# Patient Record
Sex: MALE | Race: WHITE | NOT HISPANIC OR LATINO | Employment: OTHER | URBAN - METROPOLITAN AREA
[De-identification: names, ages, dates, MRNs, and addresses within clinical notes are randomized per-mention and may not be internally consistent; named-entity substitution may affect disease eponyms.]

---

## 2023-02-20 ENCOUNTER — HOSPITAL ENCOUNTER (EMERGENCY)
Facility: CLINIC | Age: 73
Discharge: HOME | End: 2023-02-20
Attending: EMERGENCY MEDICINE | Admitting: EMERGENCY MEDICINE
Payer: COMMERCIAL

## 2023-02-20 VITALS
TEMPERATURE: 99.2 F | DIASTOLIC BLOOD PRESSURE: 76 MMHG | HEART RATE: 88 BPM | BODY MASS INDEX: 26.66 KG/M2 | WEIGHT: 180 LBS | HEIGHT: 69 IN | OXYGEN SATURATION: 97 % | SYSTOLIC BLOOD PRESSURE: 137 MMHG

## 2023-02-20 DIAGNOSIS — T83.511A URINARY TRACT INFECTION ASSOCIATED WITH CATHETERIZATION OF URINARY TRACT, UNSPECIFIED INDWELLING URINARY CATHETER TYPE, INITIAL ENCOUNTER (H): ICD-10-CM

## 2023-02-20 DIAGNOSIS — N39.0 URINARY TRACT INFECTION ASSOCIATED WITH CATHETERIZATION OF URINARY TRACT, UNSPECIFIED INDWELLING URINARY CATHETER TYPE, INITIAL ENCOUNTER (H): ICD-10-CM

## 2023-02-20 LAB
ALBUMIN UR-MCNC: 30 MG/DL
ANION GAP SERPL CALCULATED.3IONS-SCNC: 14 MMOL/L (ref 7–15)
APPEARANCE UR: ABNORMAL
BACTERIA #/AREA URNS HPF: ABNORMAL /HPF
BASOPHILS # BLD AUTO: 0 10E3/UL (ref 0–0.2)
BASOPHILS NFR BLD AUTO: 0 %
BILIRUB UR QL STRIP: NEGATIVE
BUN SERPL-MCNC: 13.6 MG/DL (ref 8–23)
CALCIUM SERPL-MCNC: 8.8 MG/DL (ref 8.8–10.2)
CHLORIDE SERPL-SCNC: 99 MMOL/L (ref 98–107)
COLOR UR AUTO: YELLOW
CREAT SERPL-MCNC: 1.08 MG/DL (ref 0.67–1.17)
DEPRECATED HCO3 PLAS-SCNC: 22 MMOL/L (ref 22–29)
EOSINOPHIL # BLD AUTO: 0 10E3/UL (ref 0–0.7)
EOSINOPHIL NFR BLD AUTO: 0 %
ERYTHROCYTE [DISTWIDTH] IN BLOOD BY AUTOMATED COUNT: 12.5 % (ref 10–15)
GFR SERPL CREATININE-BSD FRML MDRD: 72 ML/MIN/1.73M2
GLUCOSE SERPL-MCNC: 180 MG/DL (ref 70–99)
GLUCOSE UR STRIP-MCNC: NEGATIVE MG/DL
HCT VFR BLD AUTO: 36.5 % (ref 40–53)
HGB BLD-MCNC: 12.5 G/DL (ref 13.3–17.7)
HGB UR QL STRIP: ABNORMAL
IMM GRANULOCYTES # BLD: 0 10E3/UL
IMM GRANULOCYTES NFR BLD: 0 %
KETONES UR STRIP-MCNC: 15 MG/DL
LACTATE SERPL-SCNC: 1.1 MMOL/L (ref 0.7–2)
LEUKOCYTE ESTERASE UR QL STRIP: ABNORMAL
LYMPHOCYTES # BLD AUTO: 0.6 10E3/UL (ref 0.8–5.3)
LYMPHOCYTES NFR BLD AUTO: 6 %
MCH RBC QN AUTO: 31.4 PG (ref 26.5–33)
MCHC RBC AUTO-ENTMCNC: 34.2 G/DL (ref 31.5–36.5)
MCV RBC AUTO: 92 FL (ref 78–100)
MONOCYTES # BLD AUTO: 0.5 10E3/UL (ref 0–1.3)
MONOCYTES NFR BLD AUTO: 5 %
MUCOUS THREADS #/AREA URNS LPF: PRESENT /LPF
NEUTROPHILS # BLD AUTO: 8.9 10E3/UL (ref 1.6–8.3)
NEUTROPHILS NFR BLD AUTO: 89 %
NITRATE UR QL: NEGATIVE
NRBC # BLD AUTO: 0 10E3/UL
NRBC BLD AUTO-RTO: 0 /100
PH UR STRIP: 6.5 [PH] (ref 5–7)
PLATELET # BLD AUTO: 96 10E3/UL (ref 150–450)
POTASSIUM SERPL-SCNC: 3.9 MMOL/L (ref 3.4–5.3)
RBC # BLD AUTO: 3.98 10E6/UL (ref 4.4–5.9)
RBC URINE: 12 /HPF
SODIUM SERPL-SCNC: 135 MMOL/L (ref 136–145)
SP GR UR STRIP: 1.01 (ref 1–1.03)
SQUAMOUS EPITHELIAL: <1 /HPF
TRANSITIONAL EPI: 1 /HPF
UROBILINOGEN UR STRIP-MCNC: 4 MG/DL
WBC # BLD AUTO: 10.2 10E3/UL (ref 4–11)
WBC URINE: 129 /HPF

## 2023-02-20 PROCEDURE — 83605 ASSAY OF LACTIC ACID: CPT | Performed by: EMERGENCY MEDICINE

## 2023-02-20 PROCEDURE — 99284 EMERGENCY DEPT VISIT MOD MDM: CPT | Performed by: EMERGENCY MEDICINE

## 2023-02-20 PROCEDURE — 87149 DNA/RNA DIRECT PROBE: CPT | Performed by: EMERGENCY MEDICINE

## 2023-02-20 PROCEDURE — 99284 EMERGENCY DEPT VISIT MOD MDM: CPT | Mod: 25 | Performed by: EMERGENCY MEDICINE

## 2023-02-20 PROCEDURE — 250N000011 HC RX IP 250 OP 636: Performed by: EMERGENCY MEDICINE

## 2023-02-20 PROCEDURE — 85025 COMPLETE CBC W/AUTO DIFF WBC: CPT | Performed by: EMERGENCY MEDICINE

## 2023-02-20 PROCEDURE — 96365 THER/PROPH/DIAG IV INF INIT: CPT | Performed by: EMERGENCY MEDICINE

## 2023-02-20 PROCEDURE — 87186 SC STD MICRODIL/AGAR DIL: CPT | Performed by: EMERGENCY MEDICINE

## 2023-02-20 PROCEDURE — 87077 CULTURE AEROBIC IDENTIFY: CPT | Performed by: EMERGENCY MEDICINE

## 2023-02-20 PROCEDURE — 80048 BASIC METABOLIC PNL TOTAL CA: CPT | Performed by: EMERGENCY MEDICINE

## 2023-02-20 PROCEDURE — 81003 URINALYSIS AUTO W/O SCOPE: CPT | Performed by: EMERGENCY MEDICINE

## 2023-02-20 PROCEDURE — 51798 US URINE CAPACITY MEASURE: CPT | Performed by: EMERGENCY MEDICINE

## 2023-02-20 PROCEDURE — 36415 COLL VENOUS BLD VENIPUNCTURE: CPT | Performed by: EMERGENCY MEDICINE

## 2023-02-20 PROCEDURE — 81001 URINALYSIS AUTO W/SCOPE: CPT | Performed by: EMERGENCY MEDICINE

## 2023-02-20 RX ORDER — CEFTRIAXONE 2 G/1
2 INJECTION, POWDER, FOR SOLUTION INTRAMUSCULAR; INTRAVENOUS ONCE
Status: COMPLETED | OUTPATIENT
Start: 2023-02-20 | End: 2023-02-20

## 2023-02-20 RX ORDER — PHENAZOPYRIDINE HYDROCHLORIDE 200 MG/1
200 TABLET, FILM COATED ORAL 3 TIMES DAILY PRN
Qty: 12 TABLET | Refills: 0 | Status: SHIPPED | OUTPATIENT
Start: 2023-02-20

## 2023-02-20 RX ORDER — SULFAMETHOXAZOLE/TRIMETHOPRIM 800-160 MG
1 TABLET ORAL 2 TIMES DAILY
Qty: 14 TABLET | Refills: 0 | Status: SHIPPED | OUTPATIENT
Start: 2023-02-21 | End: 2023-02-28

## 2023-02-20 RX ADMIN — CEFTRIAXONE SODIUM 2 G: 2 INJECTION, POWDER, FOR SOLUTION INTRAMUSCULAR; INTRAVENOUS at 19:37

## 2023-02-20 ASSESSMENT — ACTIVITIES OF DAILY LIVING (ADL): ADLS_ACUITY_SCORE: 35

## 2023-02-20 NOTE — ED TRIAGE NOTES
Blood in urine.  Pain and frequency.  No flank pain.     Triage Assessment       Row Name 02/20/23 2376       Triage Assessment (Adult)    Airway WDL WDL       Respiratory WDL    Respiratory WDL WDL       Skin Circulation/Temperature WDL    Skin Circulation/Temperature WDL WDL       Cardiac WDL    Cardiac WDL WDL       Peripheral/Neurovascular WDL    Peripheral Neurovascular WDL WDL       Cognitive/Neuro/Behavioral WDL    Cognitive/Neuro/Behavioral WDL WDL

## 2023-02-20 NOTE — ED TRIAGE NOTES
Triage Assessment     Row Name 02/20/23 7486       Triage Assessment (Adult)    Airway WDL WDL       Respiratory WDL    Respiratory WDL WDL       Skin Circulation/Temperature WDL    Skin Circulation/Temperature WDL WDL       Cardiac WDL    Cardiac WDL WDL       Peripheral/Neurovascular WDL    Peripheral Neurovascular WDL WDL       Cognitive/Neuro/Behavioral WDL    Cognitive/Neuro/Behavioral WDL WDL

## 2023-02-21 ENCOUNTER — APPOINTMENT (OUTPATIENT)
Dept: CT IMAGING | Facility: CLINIC | Age: 73
End: 2023-02-21
Attending: EMERGENCY MEDICINE
Payer: COMMERCIAL

## 2023-02-21 ENCOUNTER — HOSPITAL ENCOUNTER (OUTPATIENT)
Facility: CLINIC | Age: 73
Setting detail: OBSERVATION
Discharge: HOME | End: 2023-02-23
Attending: EMERGENCY MEDICINE | Admitting: INTERNAL MEDICINE
Payer: COMMERCIAL

## 2023-02-21 DIAGNOSIS — R78.81 GRAM-NEGATIVE BACTEREMIA: ICD-10-CM

## 2023-02-21 DIAGNOSIS — N39.0 URINARY TRACT INFECTION WITHOUT HEMATURIA, SITE UNSPECIFIED: ICD-10-CM

## 2023-02-21 LAB
ACINETOBACTER SPECIES: NOT DETECTED
ALBUMIN UR-MCNC: 100 MG/DL
ANION GAP SERPL CALCULATED.3IONS-SCNC: 10 MMOL/L (ref 7–15)
APPEARANCE UR: CLEAR
BASOPHILS # BLD AUTO: 0 10E3/UL (ref 0–0.2)
BASOPHILS NFR BLD AUTO: 0 %
BILIRUB UR QL STRIP: NEGATIVE
BUN SERPL-MCNC: 17.7 MG/DL (ref 8–23)
CALCIUM SERPL-MCNC: 8.9 MG/DL (ref 8.8–10.2)
CHLORIDE SERPL-SCNC: 100 MMOL/L (ref 98–107)
CITROBACTER SPECIES: NOT DETECTED
COLOR UR AUTO: ABNORMAL
CREAT SERPL-MCNC: 1.2 MG/DL (ref 0.67–1.17)
CTX-M: NOT DETECTED
DEPRECATED HCO3 PLAS-SCNC: 26 MMOL/L (ref 22–29)
ENTEROBACTER SPECIES: NOT DETECTED
EOSINOPHIL # BLD AUTO: 0.1 10E3/UL (ref 0–0.7)
EOSINOPHIL NFR BLD AUTO: 1 %
ERYTHROCYTE [DISTWIDTH] IN BLOOD BY AUTOMATED COUNT: 12.3 % (ref 10–15)
ESCHERICHIA COLI: DETECTED
GFR SERPL CREATININE-BSD FRML MDRD: 63 ML/MIN/1.73M2
GLUCOSE SERPL-MCNC: 156 MG/DL (ref 70–99)
GLUCOSE UR STRIP-MCNC: NEGATIVE MG/DL
HCT VFR BLD AUTO: 37.1 % (ref 40–53)
HGB BLD-MCNC: 12.8 G/DL (ref 13.3–17.7)
HGB UR QL STRIP: NEGATIVE
HOLD SPECIMEN: NORMAL
IMM GRANULOCYTES # BLD: 0 10E3/UL
IMM GRANULOCYTES NFR BLD: 1 %
IMP: NOT DETECTED
KETONES UR STRIP-MCNC: NEGATIVE MG/DL
KLEBSIELLA OXYTOCA: NOT DETECTED
KLEBSIELLA PNEUMONIAE: NOT DETECTED
KPC: NOT DETECTED
LACTATE SERPL-SCNC: 0.7 MMOL/L (ref 0.7–2)
LEUKOCYTE ESTERASE UR QL STRIP: NEGATIVE
LYMPHOCYTES # BLD AUTO: 0.4 10E3/UL (ref 0.8–5.3)
LYMPHOCYTES NFR BLD AUTO: 7 %
MCH RBC QN AUTO: 31.5 PG (ref 26.5–33)
MCHC RBC AUTO-ENTMCNC: 34.5 G/DL (ref 31.5–36.5)
MCV RBC AUTO: 91 FL (ref 78–100)
MONOCYTES # BLD AUTO: 0.6 10E3/UL (ref 0–1.3)
MONOCYTES NFR BLD AUTO: 9 %
MUCOUS THREADS #/AREA URNS LPF: PRESENT /LPF
NDM: NOT DETECTED
NEUTROPHILS # BLD AUTO: 5.4 10E3/UL (ref 1.6–8.3)
NEUTROPHILS NFR BLD AUTO: 82 %
NITRATE UR QL: POSITIVE
NRBC # BLD AUTO: 0 10E3/UL
NRBC BLD AUTO-RTO: 0 /100
OXA (DETECTED/NOT DETECTED): NOT DETECTED
PH UR STRIP: 5.5 [PH] (ref 5–7)
PLATELET # BLD AUTO: 106 10E3/UL (ref 150–450)
POTASSIUM SERPL-SCNC: 3.6 MMOL/L (ref 3.4–5.3)
PROTEUS SPECIES: NOT DETECTED
PSEUDOMONAS AERUGINOSA: NOT DETECTED
RBC # BLD AUTO: 4.06 10E6/UL (ref 4.4–5.9)
RBC URINE: 5 /HPF
SODIUM SERPL-SCNC: 136 MMOL/L (ref 136–145)
SP GR UR STRIP: 1.02 (ref 1–1.03)
SQUAMOUS EPITHELIAL: <1 /HPF
UROBILINOGEN UR STRIP-MCNC: NORMAL MG/DL
VIM: NOT DETECTED
WBC # BLD AUTO: 6.4 10E3/UL (ref 4–11)
WBC URINE: 8 /HPF

## 2023-02-21 PROCEDURE — 81001 URINALYSIS AUTO W/SCOPE: CPT | Performed by: EMERGENCY MEDICINE

## 2023-02-21 PROCEDURE — G0378 HOSPITAL OBSERVATION PER HR: HCPCS

## 2023-02-21 PROCEDURE — 87040 BLOOD CULTURE FOR BACTERIA: CPT | Mod: XS | Performed by: EMERGENCY MEDICINE

## 2023-02-21 PROCEDURE — 80048 BASIC METABOLIC PNL TOTAL CA: CPT | Performed by: EMERGENCY MEDICINE

## 2023-02-21 PROCEDURE — 250N000011 HC RX IP 250 OP 636: Performed by: EMERGENCY MEDICINE

## 2023-02-21 PROCEDURE — 83605 ASSAY OF LACTIC ACID: CPT | Performed by: EMERGENCY MEDICINE

## 2023-02-21 PROCEDURE — 85025 COMPLETE CBC W/AUTO DIFF WBC: CPT | Performed by: EMERGENCY MEDICINE

## 2023-02-21 PROCEDURE — 87086 URINE CULTURE/COLONY COUNT: CPT | Performed by: EMERGENCY MEDICINE

## 2023-02-21 PROCEDURE — 74176 CT ABD & PELVIS W/O CONTRAST: CPT

## 2023-02-21 PROCEDURE — 36415 COLL VENOUS BLD VENIPUNCTURE: CPT | Performed by: EMERGENCY MEDICINE

## 2023-02-21 PROCEDURE — 99285 EMERGENCY DEPT VISIT HI MDM: CPT | Mod: 25 | Performed by: EMERGENCY MEDICINE

## 2023-02-21 PROCEDURE — 96365 THER/PROPH/DIAG IV INF INIT: CPT | Performed by: EMERGENCY MEDICINE

## 2023-02-21 PROCEDURE — 250N000013 HC RX MED GY IP 250 OP 250 PS 637: Performed by: EMERGENCY MEDICINE

## 2023-02-21 PROCEDURE — 99285 EMERGENCY DEPT VISIT HI MDM: CPT | Performed by: EMERGENCY MEDICINE

## 2023-02-21 RX ORDER — ACETAMINOPHEN 325 MG/1
650 TABLET ORAL EVERY 4 HOURS PRN
Status: DISCONTINUED | OUTPATIENT
Start: 2023-02-21 | End: 2023-02-22 | Stop reason: DRUGHIGH

## 2023-02-21 RX ORDER — ONDANSETRON 4 MG/1
4 TABLET, ORALLY DISINTEGRATING ORAL EVERY 6 HOURS PRN
Status: DISCONTINUED | OUTPATIENT
Start: 2023-02-21 | End: 2023-02-22

## 2023-02-21 RX ORDER — CEFTRIAXONE 1 G/1
1 INJECTION, POWDER, FOR SOLUTION INTRAMUSCULAR; INTRAVENOUS ONCE
Status: COMPLETED | OUTPATIENT
Start: 2023-02-21 | End: 2023-02-22

## 2023-02-21 RX ORDER — ONDANSETRON 2 MG/ML
4 INJECTION INTRAMUSCULAR; INTRAVENOUS EVERY 6 HOURS PRN
Status: DISCONTINUED | OUTPATIENT
Start: 2023-02-21 | End: 2023-02-22

## 2023-02-21 RX ORDER — SODIUM CHLORIDE 9 MG/ML
INJECTION, SOLUTION INTRAVENOUS CONTINUOUS
Status: DISCONTINUED | OUTPATIENT
Start: 2023-02-21 | End: 2023-02-22

## 2023-02-21 RX ORDER — SULFAMETHOXAZOLE/TRIMETHOPRIM 800-160 MG
1 TABLET ORAL ONCE
Status: COMPLETED | OUTPATIENT
Start: 2023-02-21 | End: 2023-02-21

## 2023-02-21 RX ORDER — AMLODIPINE BESYLATE 10 MG/1
10 TABLET ORAL DAILY
Status: DISCONTINUED | OUTPATIENT
Start: 2023-02-21 | End: 2023-02-23 | Stop reason: HOSPADM

## 2023-02-21 RX ORDER — CEFTRIAXONE 1 G/1
1 INJECTION, POWDER, FOR SOLUTION INTRAMUSCULAR; INTRAVENOUS ONCE
Status: COMPLETED | OUTPATIENT
Start: 2023-02-21 | End: 2023-02-21

## 2023-02-21 RX ADMIN — AMLODIPINE BESYLATE 10 MG: 10 TABLET ORAL at 20:24

## 2023-02-21 RX ADMIN — APIXABAN 5 MG: 5 TABLET, FILM COATED ORAL at 20:24

## 2023-02-21 RX ADMIN — CEFTRIAXONE SODIUM 1 G: 1 INJECTION, POWDER, FOR SOLUTION INTRAMUSCULAR; INTRAVENOUS at 20:24

## 2023-02-21 RX ADMIN — SULFAMETHOXAZOLE AND TRIMETHOPRIM 1 TABLET: 800; 160 TABLET ORAL at 20:24

## 2023-02-21 RX ADMIN — CEFTRIAXONE SODIUM 1 G: 1 INJECTION, POWDER, FOR SOLUTION INTRAMUSCULAR; INTRAVENOUS at 22:36

## 2023-02-21 ASSESSMENT — ACTIVITIES OF DAILY LIVING (ADL)
ADLS_ACUITY_SCORE: 35

## 2023-02-21 NOTE — ED TRIAGE NOTES
Told to return for positive BC. Feels improved, no fevers. Taking orals abx.      Triage Assessment     Row Name 02/21/23 8921       Triage Assessment (Adult)    Airway WDL WDL       Respiratory WDL    Respiratory WDL WDL       Skin Circulation/Temperature WDL    Skin Circulation/Temperature WDL WDL       Cardiac WDL    Cardiac WDL WDL       Peripheral/Neurovascular WDL    Peripheral Neurovascular WDL WDL       Cognitive/Neuro/Behavioral WDL    Cognitive/Neuro/Behavioral WDL WDL

## 2023-02-21 NOTE — ED PROVIDER NOTES
History     Chief Complaint   Patient presents with     Hematuria     Fever     HPI   History per patient, review of [facility] EMR and Care Everywhere EMR, including review of [facility] EMR and baseline labs which show his last Hgb was 12.6 on 2/16/23 (4 days ago).  Giovanni Jones is a 73 year old male who presents to the emergency department for 2 days of UTI symptoms with dysuria, urinary frequency and hematuria which began 2 days after urinary catheter placement during a cardiac ablation procedure 4 days ago for paroxysmal A. Fib. In the past 24 hours he has had a fever to as high as 100.7.  No abdominal pain or back or flank pain.  No prior history of UTIs or pyelonephritis.  No history of BPH or urinary retention. No history of kidney stones. Previously was on Apixiban which has been discontinued or held. No other pertinent history or acute complaint or concerns.    Previous Records in Care Everywhere were Reviewed:  [facility]   [facility]  Outside Information  Summarization of episode note  2/20/2023    Problems  Problems  Problem Status Onset Date Problem Type Date of Resolution Comments Source   AF - Atrial Fibrillation (SCT [id number]) Active   Condition   Mar 28, 2022 Entered By: BRITTANY CHANDRA Comment: paroxysmal s/p DAYAN DCCV 3/16/22 [facility]   CAD - Coronary Artery Disease (SCT 94180232) Active   Condition   Mar 28, 2022 Entered By: BRITTANY CHANDRA Comment: non-obstructive cath 2/22 [facility]   Dyslipidemia (ICD-9-.4) Active   Condition     [facility]   Essential hypertension (SNOMED CT 69924619) Active   Condition     [facility]   Inguinal hernia Active   Condition     [facility]   Keratosis, seborrheic * (ICD-9-.19) Active   Condition     [facility]   Other and unspecified alcohol dependence, continuous drinking behavior (ICD-9-CM Active   Condition     [facility]   Tobacco dependence in  remission (SNOMED CT 259951313) Active   Condition     [facility]   Diagnosis: ICD-10-CM I48.91 Unspecified atrial fibrillationwith Provider Comments: AF - Atrial Fibrillation (SCT 55922780) Active   Diagnosis     [facility]   Diagnosis: ICD-10-CM Z96.1 Presence of intraocular lenswith Provider Comments: Presence of intraocular lens Active   Diagnosis     [facility]   Diagnosis: ICD-10-CM Z96.1 Presence of intraocular lenswith Provider Comments: Pseudophakia Active   Diagnosis     [facility]   Diagnosis: ICD-10-CM I25.10 Athscl heart disease of native coronary artery w/o ang pctrswith Provider Comments: CAD - Coronary Artery Disease (SCT [id number]) Active   Diagnosis     [facility]   Diagnosis: ICD-10-CM Z71.89 Other specified counselingwith Provider Comments: Other specified Counseling Active   Diagnosis     [facility]   Diagnosis: ICD-10-CM I10 Essential (primary) hypertensionwith Provider Comments: Essential (Primary) Hypertension Active   Diagnosis     [facility]   Admit Reason: AFIB, DOFETILIDE Active   Diagnosis     [facility]   Diagnosis: ICD-10-CM I48.0 Paroxysmal atrial fibrillationwith Provider Comments: Paroxysmal atrial fibrillation Active   Diagnosis     [facility]   Diagnosis: ICD-10-CM Z01.818 Encounter for other preprocedural examinationwith Provider Comments: Encounter for other preprocedural examination Active   Diagnosis     [facility]   Diagnosis: ICD-10-CM I48.91 Unspecified atrial fibrillationwith Provider Comments: Unspecified Atrial Fibrillation Active   Diagnosis     [facility]   Diagnosis: ICD-10-CM I25.118 Athscl heart disease of native cor art w oth ang pctrswith Provider Comments: Atherosclerotic Heart Disease of Native Coronary Artery with other Forms of Angina Pectoris Active   Diagnosis     [facility]   Admit Reason: AFIB RVR Active   Diagnosis     [facility]   Diagnosis: ICD-10-CM  I20.0 Unstable anginawith Provider Comments: Unstable angina Active   Diagnosis     [facility]   Diagnosis: ICD-10-CM R07.9 Chest pain, unspecifiedwith Provider Comments: Chest Pain, unspecified Active   Diagnosis     [facility]   Diagnosis: ICD-10-CM R07.9 Chest pain, unspecifiedwith Provider Comments: Chest pain Active   Diagnosis     [facility]   Admit Reason: ACS Active   Diagnosis     [facility]   Diagnosis: ICD-10-CM I25.9 Chronic ischemic heart disease, unspecifiedwith Provider Comments: Chronic ischemic heart disease Active   Diagnosis     [facility]   Diagnosis: ICD-10-CM I21.4 Non-ST elevation (NSTEMI) myocardial infarctionwith Provider Comments: Non-St Elevation (Nstemi) Myocardial Infarction Active   Diagnosis     [facility]   Diagnosis: ICD-10-CM K63.5 Polyp of colonwith Provider Comments: Polyp of Colon Active   Diagnosis     [facility]   Diagnosis: ICD-10-CM E78.49 Other hyperlipidemiawith Provider Comments: Other hyperlipidemia Active   Diagnosis     [facility]   Diagnosis: ICD-10-CM K21.9 Gastro-esophageal reflux disease without esophagitiswith Provider Comments: Gastro-Esophageal Reflux Disease without Esophagitis Active   Diagnosis     [facility]   Diagnosis: ICD-10-CM Z23 Encounter for immunizationwith Provider Comments: Encounter for Immunization Active   Diagnosis     [facility]     Medications  Reconcile with Patient's Chart  Combined list of outpatient medications from [facility] Defense and [facility] facilities. Medications provided include 1) outpatient medications from the last 15 months, and 2) patient-reported medications.    Medications  Medication Details Route Status Patient Instructions Prescription Expires Prescription Number Last Dispense Date Ordering Provider Order Date Source   AMLODIPINE BESYLATE 10MG TAB TAKE ONE TABLET BY MOUTH EVERY DAY ORALLY SUSPENDED   01/31/2024 [id number] 04/16/2023  MAIRA JOHNSON 04/16/2023 [facility]   APIXABAN 5MG TAB TAKE ONE TABLET BY MOUTH TWICE A DAY ORALLY SUSPENDED   02/02/2024 [id number] 04/01/2023 JANIS DOMINGO 04/01/2023 [facility]   APIXABAN 5MG TAB TAKE ONE TABLET BY MOUTH TWICE A DAY ORALLY DISCONTINUED   04/02/2023 [id number] 01/06/2023 JANIS DOMINGO 04/06/2022 [facility]   COLCHICINE 0.6MG TAB TAKE 1/2 TABLET (0.3MG) BY MOUTH EVERY DAY FOR PERICARDITIS ORALLY ACTIVE   03/18/2023 [id number] 02/16/2023 MARA ROUSSEAU 02/16/2023 [facility]   DRONEDARONE 400MG TAB TAKE ONE TABLET BY MOUTH TWICE A DAY ORALLY ACTIVE   11/22/2023 [id number] 02/17/2023 GLADAVID MÉNDEZ 11/23/2022 [facility]   FISH OIL 1000MG (500MG DHA/EPA) CAP,ORAL TAKE 1 CAPSULE BY MOUTH TWICE A DAY ORALLY ACTIVE         ALETHA KNUTSON 02/24/2012 [facility]   FUROSEMIDE 20MG TAB TAKE ONE-HALF TABLET BY MOUTH EVERY DAY FOR HYPERTENSION. DIURETIC. ORALLY ACTIVE   04/28/2023 [id number] 02/07/2023 ALETHA KNUTSON 04/27/2022 [facility]   MULTIVIT/OPHTH AREDS2/LUTEIN/ZEAXANTHIN CAP/TAB TAKE 1 CAP/TAB BY MOUTH TWICE A DAY ORALLY ACTIVE   01/07/2024 [id number] 01/10/2023 MAIRA JOHNSON 01/10/2023 [facility]   OMEPRAZOLE 20MG CAP,EC TAKE ONE CAPSULE BY MOUTH EVERY DAY   ORALLY ACTIVE   01/24/2024 [id number] 01/24/2023 MAIRA JOHNSON 01/24/2023 [facility]   PREDNISOLONE ACETATE 1% SUSP,OPH INSTILL 1 DROP IN OPERATIVE EYE FOUR TIMES A DAY OPERATIVE EYE ACTIVE   06/01/2023 [id number] 05/31/2022 DESTINI PINEDA 05/31/2022 [facility]   ROSUVASTATIN CA 20MG TAB TAKE ONE TABLET BY MOUTH AT BEDTIME ORALLY SUSPENDED   02/01/2024 [id number] 03/16/2023 MAIRA JOHNSON 03/16/2023 [facility]     Allergies, Adverse Reactions, Alerts    Allergies, Adverse Reactions, Alerts  No Known Medication Allergies       Allergies:  No Known Allergies    Problem List:    There are no problems to display for this patient.    Past Medical History:   "  No past medical history on file.    Past Surgical History:    No past surgical history on file.    Family History:    No family history on file.    Social History:  Marital Status:  Single [1]        Medications:    phenazopyridine (PYRIDIUM) 200 MG tablet  [START ON 2/21/2023] sulfamethoxazole-trimethoprim (BACTRIM DS) 800-160 MG tablet      Review of Systems  As mentioned in the HPI, in addition focused review of systems was negative.    Physical Exam   BP: 132/66  Pulse: 73  Temp: 98.5  F (36.9  C)  Height: 175.3 cm (5' 9\")  Weight: 81.6 kg (180 lb)  SpO2: 97 %      Physical Exam  Vitals and nursing note reviewed.   Constitutional:       General: He is not in acute distress.     Appearance: Normal appearance. He is well-developed. He is not ill-appearing or diaphoretic.   HENT:      Head: Normocephalic and atraumatic.   Eyes:      General: No scleral icterus.     Extraocular Movements: Extraocular movements intact.      Conjunctiva/sclera: Conjunctivae normal.   Neck:      Trachea: No tracheal deviation.   Cardiovascular:      Rate and Rhythm: Normal rate and regular rhythm.      Heart sounds: Normal heart sounds.   Pulmonary:      Effort: Pulmonary effort is normal. No respiratory distress.      Breath sounds: Normal breath sounds.   Abdominal:      General: There is no distension.      Palpations: Abdomen is soft.      Tenderness: There is abdominal tenderness ( mild suprapubic/bladder area tenderness). There is no right CVA tenderness, left CVA tenderness, guarding or rebound.   Musculoskeletal:         General: Normal range of motion.      Cervical back: Normal range of motion and neck supple.      Right lower leg: No edema.      Left lower leg: No edema.   Skin:     General: Skin is warm and dry.      Coloration: Skin is not pale.      Findings: No erythema or rash.   Neurological:      General: No focal deficit present.      Mental Status: He is alert and oriented to person, place, and time. "   Psychiatric:         Mood and Affect: Mood normal.         Behavior: Behavior normal.         ED Course           Procedures              Results for orders placed or performed during the hospital encounter of 02/20/23 (from the past 24 hour(s))   UA with Microscopic reflex to Culture    Specimen: Urine, Midstream   Result Value Ref Range    Color Urine Yellow Colorless, Straw, Light Yellow, Yellow    Appearance Urine Cloudy (A) Clear    Glucose Urine Negative Negative mg/dL    Bilirubin Urine Negative Negative    Ketones Urine 15 (A) Negative mg/dL    Specific Gravity Urine 1.010 1.003 - 1.035    Blood Urine Small (A) Negative    pH Urine 6.5 5.0 - 7.0    Protein Albumin Urine 30 (A) Negative mg/dL    Urobilinogen Urine 4.0 (A) Normal, 2.0 mg/dL    Nitrite Urine Negative Negative    Leukocyte Esterase Urine Large (A) Negative    Bacteria Urine Few (A) None Seen /HPF    Mucus Urine Present (A) None Seen /LPF    RBC Urine 12 (H) <=2 /HPF    WBC Urine 129 (H) <=5 /HPF    Squamous Epithelials Urine <1 <=1 /HPF    Transitional Epithelials Urine 1 <=1 /HPF    Narrative    Urine Culture ordered based on laboratory criteria   CBC with platelets differential    Narrative    The following orders were created for panel order CBC with platelets differential.  Procedure                               Abnormality         Status                     ---------                               -----------         ------                     CBC with platelets and d...[[id number]]  Abnormal            Final result                 Please view results for these tests on the individual orders.   Basic metabolic panel   Result Value Ref Range    Sodium 135 (L) 136 - 145 mmol/L    Potassium 3.9 3.4 - 5.3 mmol/L    Chloride 99 98 - 107 mmol/L    Carbon Dioxide (CO2) 22 22 - 29 mmol/L    Anion Gap 14 7 - 15 mmol/L    Urea Nitrogen 13.6 8.0 - 23.0 mg/dL    Creatinine 1.08 0.67 - 1.17 mg/dL    Calcium 8.8 8.8 - 10.2 mg/dL    Glucose 180 (H) 70 -  99 mg/dL    GFR Estimate 72 >60 mL/min/1.73m2   Lactic acid whole blood   Result Value Ref Range    Lactic Acid 1.1 0.7 - 2.0 mmol/L   CBC with platelets and differential   Result Value Ref Range    WBC Count 10.2 4.0 - 11.0 10e3/uL    RBC Count 3.98 (L) 4.40 - 5.90 10e6/uL    Hemoglobin 12.5 (L) 13.3 - 17.7 g/dL    Hematocrit 36.5 (L) 40.0 - 53.0 %    MCV 92 78 - 100 fL    MCH 31.4 26.5 - 33.0 pg    MCHC 34.2 31.5 - 36.5 g/dL    RDW 12.5 10.0 - 15.0 %    Platelet Count 96 (L) 150 - 450 10e3/uL    % Neutrophils 89 %    % Lymphocytes 6 %    % Monocytes 5 %    % Eosinophils 0 %    % Basophils 0 %    % Immature Granulocytes 0 %    NRBCs per 100 WBC 0 <1 /100    Absolute Neutrophils 8.9 (H) 1.6 - 8.3 10e3/uL    Absolute Lymphocytes 0.6 (L) 0.8 - 5.3 10e3/uL    Absolute Monocytes 0.5 0.0 - 1.3 10e3/uL    Absolute Eosinophils 0.0 0.0 - 0.7 10e3/uL    Absolute Basophils 0.0 0.0 - 0.2 10e3/uL    Absolute Immature Granulocytes 0.0 <=0.4 10e3/uL    Absolute NRBCs 0.0 10e3/uL       Medications   cefTRIAXone (ROCEPHIN) 2 g vial to attach to  ml bag for ADULTS or NS 50 ml bag for PEDS (2 g Intravenous New Bag 2/20/23 1937)       Postvoid bladder scan: 7 mL.    CT abd/pelvis was considered and deferred. Doubt obstructing process such as stone or mass associated with his UTI.    Assessments & Plan (with Medical Decision Making)   73 year old male who presents to the emergency department for 2 days of UTI symptoms with dysuria, urinary frequency and hematuria which began 2 days after urinary catheter placement during a cardiac ablation procedure 4 days ago for paroxysmal A. Fib. In the past 24 hours he has had a fever to as high as 100.7.  No abdominal pain or back or flank pain.  No prior history of UTIs or pyelonephritis.  No history of BPH or urinary retention. No history of kidney stones. No current anticoagulation therapy.  Catheterization/urinary tract induced UTI with no evidence of pyelonephritis or urosepsis in the  ED and doubt presence of urinary obstruction such as stone. Afebrile in the ED with NL WBC and lactate. UTI with urine culture pending. He was given Ceftriaxone and rx Bactrim DS for 7 days. He will f/u on urine culture results in 2 days with his PMD/clinic at the [facility]. He was provided instructions for supportive care and will return as needed for worsened condition or worsening symptoms, or new problems or concerns.      I have reviewed the nursing notes.    I have reviewed the findings, diagnosis, plan and need for follow up with the patient.    New Prescriptions    PHENAZOPYRIDINE (PYRIDIUM) 200 MG TABLET    Take 1 tablet (200 mg) by mouth 3 times daily as needed for irritation (as needed for urinary discomfort)    SULFAMETHOXAZOLE-TRIMETHOPRIM (BACTRIM DS) 800-160 MG TABLET    Take 1 tablet by mouth 2 times daily for 7 days       Final diagnoses:   Urinary tract infection associated with catheterization of urinary tract, unspecified indwelling urinary catheter type, initial encounter (H) - Catheter placed for a cardiac ablation procedure 4 days ago       2/20/2023   [facility] EMERGENCY DEPT          Zana Aguayo MD  02/21/23 3101

## 2023-02-21 NOTE — RESULT ENCOUNTER NOTE
Critical value note regarding Positive blood culture    Information below copied and pasted below from ED Triage Notes   [facility] Emergency Provider/Nurse:Aislinn Cobb RN  Date and Time of call:  2/21/2023  2:18 PM  Response/comment:  Pt notified of positive blood cultures. Pt advised to be seen in an ED.

## 2023-02-22 LAB — BACTERIA UR CULT: ABNORMAL

## 2023-02-22 PROCEDURE — 250N000013 HC RX MED GY IP 250 OP 250 PS 637: Performed by: NURSE PRACTITIONER

## 2023-02-22 PROCEDURE — 250N000011 HC RX IP 250 OP 636: Performed by: NURSE PRACTITIONER

## 2023-02-22 PROCEDURE — G0378 HOSPITAL OBSERVATION PER HR: HCPCS

## 2023-02-22 PROCEDURE — 96366 THER/PROPH/DIAG IV INF ADDON: CPT

## 2023-02-22 PROCEDURE — 250N000013 HC RX MED GY IP 250 OP 250 PS 637: Performed by: FAMILY MEDICINE

## 2023-02-22 PROCEDURE — 99222 1ST HOSP IP/OBS MODERATE 55: CPT | Mod: AI | Performed by: NURSE PRACTITIONER

## 2023-02-22 PROCEDURE — 258N000003 HC RX IP 258 OP 636: Performed by: NURSE PRACTITIONER

## 2023-02-22 PROCEDURE — 96376 TX/PRO/DX INJ SAME DRUG ADON: CPT

## 2023-02-22 RX ORDER — FUROSEMIDE 20 MG/1
10 TABLET ORAL DAILY
Status: DISCONTINUED | OUTPATIENT
Start: 2023-02-22 | End: 2023-02-23 | Stop reason: HOSPADM

## 2023-02-22 RX ORDER — POLYETHYLENE GLYCOL 3350 17 G/17G
17 POWDER, FOR SOLUTION ORAL DAILY PRN
Status: DISCONTINUED | OUTPATIENT
Start: 2023-02-22 | End: 2023-02-23 | Stop reason: HOSPADM

## 2023-02-22 RX ORDER — NALOXONE HYDROCHLORIDE 0.4 MG/ML
0.2 INJECTION, SOLUTION INTRAMUSCULAR; INTRAVENOUS; SUBCUTANEOUS
Status: DISCONTINUED | OUTPATIENT
Start: 2023-02-22 | End: 2023-02-23 | Stop reason: HOSPADM

## 2023-02-22 RX ORDER — PROCHLORPERAZINE 25 MG
12.5 SUPPOSITORY, RECTAL RECTAL EVERY 12 HOURS PRN
Status: DISCONTINUED | OUTPATIENT
Start: 2023-02-22 | End: 2023-02-23 | Stop reason: HOSPADM

## 2023-02-22 RX ORDER — AMLODIPINE BESYLATE 5 MG/1
10 TABLET ORAL DAILY
Status: DISCONTINUED | OUTPATIENT
Start: 2023-02-22 | End: 2023-02-22

## 2023-02-22 RX ORDER — PROCHLORPERAZINE MALEATE 5 MG
5 TABLET ORAL EVERY 6 HOURS PRN
Status: DISCONTINUED | OUTPATIENT
Start: 2023-02-22 | End: 2023-02-23 | Stop reason: HOSPADM

## 2023-02-22 RX ORDER — OXYCODONE HYDROCHLORIDE 5 MG/1
5 TABLET ORAL EVERY 4 HOURS PRN
Status: DISCONTINUED | OUTPATIENT
Start: 2023-02-22 | End: 2023-02-23 | Stop reason: HOSPADM

## 2023-02-22 RX ORDER — ROSUVASTATIN CALCIUM 20 MG/1
20 TABLET, COATED ORAL AT BEDTIME
COMMUNITY
Start: 2023-01-31

## 2023-02-22 RX ORDER — ACETAMINOPHEN 325 MG/1
650 TABLET ORAL EVERY 6 HOURS PRN
Status: DISCONTINUED | OUTPATIENT
Start: 2023-02-22 | End: 2023-02-23 | Stop reason: HOSPADM

## 2023-02-22 RX ORDER — COLCHICINE 0.6 MG/1
TABLET ORAL
COMMUNITY
Start: 2023-02-16

## 2023-02-22 RX ORDER — ONDANSETRON 4 MG/1
4 TABLET, ORALLY DISINTEGRATING ORAL EVERY 6 HOURS PRN
Status: DISCONTINUED | OUTPATIENT
Start: 2023-02-22 | End: 2023-02-23 | Stop reason: HOSPADM

## 2023-02-22 RX ORDER — FUROSEMIDE 20 MG
10 TABLET ORAL DAILY
COMMUNITY
Start: 2022-04-27

## 2023-02-22 RX ORDER — AMLODIPINE BESYLATE 10 MG/1
1 TABLET ORAL DAILY
COMMUNITY
Start: 2023-01-30

## 2023-02-22 RX ORDER — PANTOPRAZOLE SODIUM 40 MG/1
40 TABLET, DELAYED RELEASE ORAL DAILY
Status: DISCONTINUED | OUTPATIENT
Start: 2023-02-22 | End: 2023-02-23 | Stop reason: HOSPADM

## 2023-02-22 RX ORDER — CEFTRIAXONE 2 G/1
2 INJECTION, POWDER, FOR SOLUTION INTRAMUSCULAR; INTRAVENOUS EVERY 24 HOURS
Status: DISCONTINUED | OUTPATIENT
Start: 2023-02-22 | End: 2023-02-23

## 2023-02-22 RX ORDER — OMEGA-3S/DHA/EPA/FISH OIL/D3 300MG-1000
1200 CAPSULE ORAL 2 TIMES DAILY
COMMUNITY

## 2023-02-22 RX ORDER — NALOXONE HYDROCHLORIDE 0.4 MG/ML
0.4 INJECTION, SOLUTION INTRAMUSCULAR; INTRAVENOUS; SUBCUTANEOUS
Status: DISCONTINUED | OUTPATIENT
Start: 2023-02-22 | End: 2023-02-23 | Stop reason: HOSPADM

## 2023-02-22 RX ORDER — SULFAMETHOXAZOLE/TRIMETHOPRIM 800-160 MG
1 TABLET ORAL 2 TIMES DAILY
Status: DISCONTINUED | OUTPATIENT
Start: 2023-02-22 | End: 2023-02-23

## 2023-02-22 RX ORDER — AMOXICILLIN 250 MG
1 CAPSULE ORAL 2 TIMES DAILY PRN
Status: DISCONTINUED | OUTPATIENT
Start: 2023-02-22 | End: 2023-02-23 | Stop reason: HOSPADM

## 2023-02-22 RX ORDER — ROSUVASTATIN CALCIUM 20 MG/1
20 TABLET, COATED ORAL AT BEDTIME
Status: DISCONTINUED | OUTPATIENT
Start: 2023-02-22 | End: 2023-02-23 | Stop reason: HOSPADM

## 2023-02-22 RX ORDER — OMEPRAZOLE 20 MG/1
20 TABLET, DELAYED RELEASE ORAL DAILY
COMMUNITY
End: 2023-02-22

## 2023-02-22 RX ORDER — COLCHICINE 0.6 MG
0.3 TABLET ORAL EVERY EVENING
Status: DISCONTINUED | OUTPATIENT
Start: 2023-02-22 | End: 2023-02-23 | Stop reason: HOSPADM

## 2023-02-22 RX ORDER — ANTIOX #8/OM3/DHA/EPA/LUT/ZEAX 250-2.5 MG
CAPSULE ORAL
COMMUNITY
Start: 2023-01-06

## 2023-02-22 RX ORDER — SODIUM CHLORIDE 9 MG/ML
INJECTION, SOLUTION INTRAVENOUS CONTINUOUS
Status: ACTIVE | OUTPATIENT
Start: 2023-02-22 | End: 2023-02-22

## 2023-02-22 RX ORDER — AMOXICILLIN 250 MG
2 CAPSULE ORAL 2 TIMES DAILY PRN
Status: DISCONTINUED | OUTPATIENT
Start: 2023-02-22 | End: 2023-02-23 | Stop reason: HOSPADM

## 2023-02-22 RX ORDER — ONDANSETRON 2 MG/ML
4 INJECTION INTRAMUSCULAR; INTRAVENOUS EVERY 6 HOURS PRN
Status: DISCONTINUED | OUTPATIENT
Start: 2023-02-22 | End: 2023-02-23 | Stop reason: HOSPADM

## 2023-02-22 RX ADMIN — ROSUVASTATIN CALCIUM 20 MG: 20 TABLET, FILM COATED ORAL at 20:22

## 2023-02-22 RX ADMIN — COLCHICINE 0.3 MG: 0.6 TABLET ORAL at 17:48

## 2023-02-22 RX ADMIN — PANTOPRAZOLE SODIUM 40 MG: 40 TABLET, DELAYED RELEASE ORAL at 11:44

## 2023-02-22 RX ADMIN — AMLODIPINE BESYLATE 10 MG: 10 TABLET ORAL at 20:22

## 2023-02-22 RX ADMIN — FUROSEMIDE 10 MG: 20 TABLET ORAL at 11:44

## 2023-02-22 RX ADMIN — SULFAMETHOXAZOLE AND TRIMETHOPRIM 1 TABLET: 800; 160 TABLET ORAL at 17:47

## 2023-02-22 RX ADMIN — CEFTRIAXONE SODIUM 2 G: 2 INJECTION, POWDER, FOR SOLUTION INTRAMUSCULAR; INTRAVENOUS at 17:48

## 2023-02-22 RX ADMIN — APIXABAN 5 MG: 5 TABLET, FILM COATED ORAL at 20:22

## 2023-02-22 RX ADMIN — SODIUM CHLORIDE: 9 INJECTION, SOLUTION INTRAVENOUS at 17:47

## 2023-02-22 RX ADMIN — APIXABAN 5 MG: 5 TABLET, FILM COATED ORAL at 11:44

## 2023-02-22 ASSESSMENT — ACTIVITIES OF DAILY LIVING (ADL)
ADLS_ACUITY_SCORE: 35
TOILETING_ISSUES: NO
ADLS_ACUITY_SCORE: 35
DIFFICULTY_EATING/SWALLOWING: NO
FALL_HISTORY_WITHIN_LAST_SIX_MONTHS: NO
HEARING_DIFFICULTY_OR_DEAF: NO
ADLS_ACUITY_SCORE: 35
DIFFICULTY_COMMUNICATING: NO
ADLS_ACUITY_SCORE: 35
DRESSING/BATHING_DIFFICULTY: NO
ADLS_ACUITY_SCORE: 20
ADLS_ACUITY_SCORE: 20
ADLS_ACUITY_SCORE: 35
ADLS_ACUITY_SCORE: 20
DOING_ERRANDS_INDEPENDENTLY_DIFFICULTY: NO
VISION_MANAGEMENT: GLASSES
ADLS_ACUITY_SCORE: 35
CHANGE_IN_FUNCTIONAL_STATUS_SINCE_ONSET_OF_CURRENT_ILLNESS/INJURY: NO
ADLS_ACUITY_SCORE: 35
CONCENTRATING,_REMEMBERING_OR_MAKING_DECISIONS_DIFFICULTY: NO
WEAR_GLASSES_OR_BLIND: YES
WALKING_OR_CLIMBING_STAIRS_DIFFICULTY: NO
ADLS_ACUITY_SCORE: 35
ADLS_ACUITY_SCORE: 20

## 2023-02-22 NOTE — ED PROVIDER NOTES
"  History     Chief Complaint   Patient presents with     Abnormal Labs     Here yesterday with UTI, now with positive blood culture. Advised to return.      HPI  Giovanni Jones is a 73 year old male with a past medical history significant for coronary disease hypertension hyperlipidemia and recent history of A-fib with ablation on 16th February with Jean-Baptiste catheter placement.  Patient had it removed postprocedure and was seen yesterday in the ED for 2-day history of UTI symptoms dysuria.  No prior history of UTIs or pyelonephritis.  Patient had a UTI and was treated with ceftriaxone and started on Bactrim.  His blood culture has grown back gram-negative rods.  Patient states he is feeling better at this time.  He denies any fevers or chills has not any headache or visual changes denies any neck pain he has not had any chest pain.  He denies any shortness of breath his urinary symptoms have improved does have some mild back pain denies any bowel or bladder dysfunction.  He has not had any focal numbness weakness in extremity and denies any bowel or bladder dysfunction    Allergies:  No Known Allergies    Problem List:    There are no problems to display for this patient.       Past Medical History:    No past medical history on file.    Past Surgical History:    No past surgical history on file.    Family History:    No family history on file.    Social History:  Marital Status:  Single [1]        Medications:    phenazopyridine (PYRIDIUM) 200 MG tablet  sulfamethoxazole-trimethoprim (BACTRIM DS) 800-160 MG tablet          Review of Systems  All systems reviewed and other than pertinent positives and negatives in HPI all other systems are negative.  Physical Exam   BP: (!) 160/94  Pulse: 87  Temp: 98.7  F (37.1  C)  Resp: 16  Height: 172.7 cm (5' 8\")  Weight: 80.7 kg (178 lb)  SpO2: 98 %      Physical Exam  Vitals and nursing note reviewed.   Constitutional:       General: He is not in acute distress.     Appearance: " Normal appearance. He is not ill-appearing, toxic-appearing or diaphoretic.   HENT:      Head: Normocephalic and atraumatic.      Nose: Nose normal.      Mouth/Throat:      Mouth: Mucous membranes are moist.      Pharynx: Oropharynx is clear.   Eyes:      Conjunctiva/sclera: Conjunctivae normal.   Cardiovascular:      Rate and Rhythm: Normal rate and regular rhythm.      Pulses: Normal pulses.      Heart sounds: Normal heart sounds. No murmur heard.  Pulmonary:      Effort: Pulmonary effort is normal.      Breath sounds: Normal breath sounds. No stridor. No wheezing or rhonchi.   Abdominal:      General: Abdomen is flat.      Palpations: Abdomen is soft.      Tenderness: There is no guarding or rebound.      Hernia: No hernia is present.   Musculoskeletal:         General: No swelling or tenderness. Normal range of motion.      Cervical back: Normal range of motion and neck supple.      Right lower leg: No edema.      Left lower leg: No edema.   Skin:     General: Skin is warm and dry.      Findings: No rash.   Neurological:      General: No focal deficit present.      Mental Status: He is alert and oriented to person, place, and time.      Sensory: No sensory deficit.      Motor: No weakness.      Coordination: Coordination normal.   Psychiatric:         Mood and Affect: Mood normal.         ED Course                 Procedures              Critical Care time:  none               Results for orders placed or performed during the hospital encounter of 02/20/23 (from the past 24 hour(s))   CBC with platelets differential    Narrative    The following orders were created for panel order CBC with platelets differential.  Procedure                               Abnormality         Status                     ---------                               -----------         ------                     CBC with platelets and d...[[id number]]  Abnormal            Final result                 Please view results for these tests on  the individual orders.   Basic metabolic panel   Result Value Ref Range    Sodium 135 (L) 136 - 145 mmol/L    Potassium 3.9 3.4 - 5.3 mmol/L    Chloride 99 98 - 107 mmol/L    Carbon Dioxide (CO2) 22 22 - 29 mmol/L    Anion Gap 14 7 - 15 mmol/L    Urea Nitrogen 13.6 8.0 - 23.0 mg/dL    Creatinine 1.08 0.67 - 1.17 mg/dL    Calcium 8.8 8.8 - 10.2 mg/dL    Glucose 180 (H) 70 - 99 mg/dL    GFR Estimate 72 >60 mL/min/1.73m2   Blood Culture Peripheral Blood    Specimen: Peripheral Blood   Result Value Ref Range    Culture No growth after 12 hours    Lactic acid whole blood   Result Value Ref Range    Lactic Acid 1.1 0.7 - 2.0 mmol/L   CBC with platelets and differential   Result Value Ref Range    WBC Count 10.2 4.0 - 11.0 10e3/uL    RBC Count 3.98 (L) 4.40 - 5.90 10e6/uL    Hemoglobin 12.5 (L) 13.3 - 17.7 g/dL    Hematocrit 36.5 (L) 40.0 - 53.0 %    MCV 92 78 - 100 fL    MCH 31.4 26.5 - 33.0 pg    MCHC 34.2 31.5 - 36.5 g/dL    RDW 12.5 10.0 - 15.0 %    Platelet Count 96 (L) 150 - 450 10e3/uL    % Neutrophils 89 %    % Lymphocytes 6 %    % Monocytes 5 %    % Eosinophils 0 %    % Basophils 0 %    % Immature Granulocytes 0 %    NRBCs per 100 WBC 0 <1 /100    Absolute Neutrophils 8.9 (H) 1.6 - 8.3 10e3/uL    Absolute Lymphocytes 0.6 (L) 0.8 - 5.3 10e3/uL    Absolute Monocytes 0.5 0.0 - 1.3 10e3/uL    Absolute Eosinophils 0.0 0.0 - 0.7 10e3/uL    Absolute Basophils 0.0 0.0 - 0.2 10e3/uL    Absolute Immature Granulocytes 0.0 <=0.4 10e3/uL    Absolute NRBCs 0.0 10e3/uL   Blood Culture Peripheral Blood    Specimen: Peripheral Blood   Result Value Ref Range    Culture Positive on the 1st day of incubation (A)     Culture Gram negative bacilli (AA)    Verigene GN Panel    Specimen: Peripheral Blood   Result Value Ref Range    Acinetobacter species Not Detected Not Detected    Citrobacter species Not Detected Not Detected    Enterobacter species Not Detected Not Detected    Proteus species Not Detected Not Detected    Escherichia  coli Detected (A) Not Detected    Klebsiella pneumoniae Not Detected Not Detected    Klebsiella oxytoca Not Detected Not Detected    Pseudomonas aeruginosa Not Detected Not Detected    CTX-M Not Detected Not Detected, NA    KPC Not Detected Not Detected, NA    NDM Not Detected Not Detected, NA    VIM Not Detected Not Detected, NA    IMP Not Detected Not Detected, NA    OXA Not Detected Not Detected, NA    Narrative    Specimen tested with Verigene multiplex, gram-negative blood culture nucleic acid test for the following targets: Acinetobacter species, Citrobacter species, Enterobacter species, Proteus species, Escherichia coli, Klebsiella pneumoniae, Klebsiella oxytoca, Pseudomonas aeruginosa, and the following resistance markers: CTX-M, KPC, NDM, VIM, IMP and OXA.       Medications   amLODIPine (NORVASC) tablet 10 mg (10 mg Oral Given 2/21/23 2024)   dronedarone (MULTAQ) tablet 400 mg (400 mg Oral Not Given 2/21/23 2041)   cefTRIAXone (ROCEPHIN) 1 g vial to attach to  mL bag for ADULTS or NS 50 mL bag for PEDS (has no administration in time range)   apixaban ANTICOAGULANT (ELIQUIS) tablet 5 mg (5 mg Oral Given 2/21/23 2024)   sulfamethoxazole-trimethoprim (BACTRIM DS) 800-160 MG per tablet 1 tablet (1 tablet Oral Given 2/21/23 2024)   cefTRIAXone (ROCEPHIN) 1 g vial to attach to  mL bag for ADULTS or NS 50 mL bag for PEDS (0 g Intravenous Stopped 2/21/23 2114)       Assessments & Plan (with Medical Decision Making) records were reviewed.  Labs were obtained.  Cultures and previous visit were reviewed in detail.  Patient's recent surgery was also reviewed.  Blood cultures were obtained again and patient was covered with Rocephin.  Wound culture has grown out gram-negative bacilli.  Other cultures negative.  Patient's white count was 6.4 hemoglobin 12.8 platelet count 106 with no significant left shift.  Lactic acid was within normal limits.  His metabolic panel without significant abnormality.  Urine  analysis with positive nitrate 8 WBCs present.  I did discuss case in detail with Dr. Wesley with infectious disease.  She wanted blood cultures drawn and patient continued to be covered with ceftriaxone with him staying in the emergency department/being admitted until further culture results came back.  She did not feel discharging was a good idea at this time.  I did discuss with her that patient did look well at this time but she wanted him admitted until culture results are back.  I explained this to the patient and he is in agreement with this plan.  He was covered with several of his evening medications including her dose of Bactrim.  Patient was disappointed in the need to be observed/admitted but at this time due to infectious disease recommendation patient will be boarded in the ED to await culture results. Would reconsult with ID when cultures have returend.     I have reviewed the nursing notes.    I have reviewed the findings, diagnosis, plan and need for follow up with the patient.           New Prescriptions    No medications on file       Final diagnoses:   Urinary tract infection without hematuria, site unspecified   Gram-negative bacteremia       2/21/2023   [facility] EMERGENCY DEPT     David Hayes MD  02/22/23 5900

## 2023-02-22 NOTE — ED NOTES
Observation Brochure     Patient informed of observation status based on provider's order.  Observation brochure was given. Patient/Family stated understanding. Questions answered.  Aleena Michaels RN

## 2023-02-22 NOTE — H&P
[facility]    History and Physical  Hospital Medicine       Date of Admission:  2/21/2023  Date of Service: 2/22/2023     Assessment & Plan   Giovanni Jones is a 73 year old male who presents on 2/21/2023 after being called back due to positive blood culture.    Mr. Jones was previously seen in the ED on 2/20/2023 for UTI symptoms of dysuria, frequency, and discomfort.  He was discharged on Bactrim.  On 2/21/2023 he was contacted due to GNR growth in 1/2 blood culture.  He returned to the ED and was started on ceftriaxone along with the Bactrim per infectious disease.  This will continue until susceptibility of culture is completed.  He reports improvement of the symptoms since initial treatment with Bactrim.  Denies any further fevers or urinary symptoms.    Urinary tract infection  Positive blood culture GNR growth  Gram negative bacteremia without leukocytosis  Urinary tract infection after Jean-Baptiste catheter placement during procedure.  Symptom onset 2 to 3 days after procedure.  1 of 2 blood cultures returned gram-negative rods, patient was advised to return to the hospital which he did.  No leukocytosis.  Reported fever of 100.7 on initial visit on 2/20/2023  -Continue treatment with Bactrim  -Add ceftriaxone per ED- ID conversation  -Admit for observation while awaiting susceptibilities  -Adjust antibiotic therapy based on susceptibilities    Hypertension  Hyperlipidemia  Pericarditis  Atrial fibrillation, paroxysmal  Continue home medications colchicine, furosemide, amlodipine, and apixaban    GERD  Substitute pantoprazole for omeprazole    Active Problems:    * No active hospital problems. *    Clinically Significant Risk Factors Present on Admission               # Drug Induced Coagulation Defect: home medication list includes an anticoagulant medication  # Thrombocytopenia: Lowest platelets = 96 in last 2 days, will monitor for bleeding        # Overweight: Estimated body mass index  "is 27.06 kg/m  as calculated from the following:    Height as of this encounter: 1.727 m (5' 8\").    Weight as of this encounter: 80.7 kg (178 lb).                Diet: Combination Diet Low Saturated Fat Na <2400mg Diet, Low Saturated Fat Diet    DVT Prophylaxis: DOAC  Jean-Baptiste Catheter: Not present  Code Status: Full Code    Lines: None    Disposition Plan      Expected Discharge Date: 02/23/2023             Entered: OTILIA RIVERA CNP 02/22/2023, 4:29 PM       The patient's care was discussed with the patient and Attending Physician, Dr. Pham.  I have discussed patient and formulated plan with attending hospitalist physician, Dr. Pham.  OTILIA RIVERA CNP        Primary Care Physician   Clinic, [facility] [phone]    History is obtained from the patient and review of EMR.    History of Present Illness   Giovanni Jones is a 73 year old male with past medical history of p A-fib s/p ablation on 2/16/2023, on apixaban, hypertension now presents on 2/21/2023 with UTI and positive blood culture.  Mr. Jones was seen at the [facility] on 2/16/2023 and treated for atrial fibrillation by ablation.  A Jean-Baptiste catheter was inserted for the procedure and removed prior to discharge.  2 to 3 days later he was experiencing discomfort with urination, frequency, and noted hematuria.  For this he came to [facility] emergency room was evaluated and discharged on 7 days of Bactrim.  Yesterday blood cultures returned positive for gram-negative rods.  He was contacted and advised to return to the emergency room for further evaluation.  He is being admitted as observation while awaiting sensitivity from urine and blood cultures.    Review of Systems   CONSTITUTIONAL: NEGATIVE for fever, chills, change in weight  ENT/MOUTH: NEGATIVE for ear, mouth and throat problems  RESP: NEGATIVE for significant cough or SOB  CV: NEGATIVE for chest pain, palpitations or peripheral edema  : negative for dysuria, hematuria, " decreased urinary stream, erectile dysfunction  MUSCULOSKELETAL: NEGATIVE for significant arthralgias or myalgia  NEURO: NEGATIVE for weakness, dizziness or paresthesias    Past Medical History    No past medical history on file.  There are no problems to display for this patient.       Past Surgical History   No past surgical history on file.     Prior to Admission Medications   Prior to Admission Medications   Prescriptions Last Dose Informant Patient Reported? Taking?   METAMUCIL FIBER PO Past Week Self Yes Yes   Sig: Take by mouth as needed   Multiple Vitamins-Minerals (PRESERVISION AREDS 2) CAPS 2/21/2023 at am Self Yes Yes   Sig: TAKE 1 CAP/TAB BY MOUTH TWICE A DAY   Omega-3 Fatty Acids (FISH OIL BURP-LESS) 1200 MG CAPS 2/21/2023 at am Self Yes Yes   Sig: Take 1,200 tablets by mouth 2 times daily   amLODIPine (NORVASC) 10 MG tablet 2/20/2023 at pm Self Yes Yes   Sig: Take 1 tablet by mouth daily   apixaban ANTICOAGULANT (ELIQUIS) 5 MG tablet 2/21/2023 at am Self Yes Yes   Sig: Take 1 tablet by mouth 2 times daily   colchicine (COLCYRS) 0.6 MG tablet 2/20/2023 at pm Self Yes Yes   Sig: TAKE 1/2 TABLET (0.3MG) BY MOUTH EVERY DAY FOR PERICARDITIS   dronedarone (MULTAQ) 400 MG TABS tablet 2/21/2023 at am Self Yes Yes   Sig: Take 1 tablet by mouth 2 times daily   furosemide (LASIX) 20 MG tablet 2/21/2023 at am Self Yes Yes   Sig: Take 10 mg by mouth daily   nicotine polacrilex (NICORETTE) 4 MG gum Past Week Self Yes Yes   Sig: CHEW 1 PIECE IN MOUTH AS DIRECTED AND PARK BETWEEN CHEEK AND GUM - FOR URGE TO SMOKE   omeprazole (PRILOSEC) 20 MG DR capsule 2/21/2023 at am Self Yes Yes   Sig: Take 20 mg by mouth daily 30 minutes prior to a meal   phenazopyridine (PYRIDIUM) 200 MG tablet 2/20/2023 Self No No   Sig: Take 1 tablet (200 mg) by mouth 3 times daily as needed for irritation (as needed for urinary discomfort)   rosuvastatin (CRESTOR) 20 MG tablet 2/20/2023 at hs Self Yes Yes   Sig: Take 20 mg by mouth At Bedtime  "  sulfamethoxazole-trimethoprim (BACTRIM DS) 800-160 MG tablet 2/21/2023 at am Self No Yes   Sig: Take 1 tablet by mouth 2 times daily for 7 days      Facility-Administered Medications: None     Allergies   No Known Allergies    Family History    No family history on file.    Social History   Social History     Socioeconomic History     Marital status: Single     Spouse name: Not on file     Number of children: Not on file     Years of education: Not on file     Highest education level: Not on file   Occupational History     Not on file   Tobacco Use     Smoking status: Not on file     Smokeless tobacco: Not on file   Substance and Sexual Activity     Alcohol use: Not on file     Drug use: Not on file     Sexual activity: Not on file   Other Topics Concern     Not on file   Social History Narrative     Not on file     Social Determinants of Health     Financial Resource Strain: Not on file   Food Insecurity: Not on file   Transportation Needs: Not on file   Physical Activity: Not on file   Stress: Not on file   Social Connections: Not on file   Intimate Partner Violence: Not on file   Housing Stability: Not on file       Physical Exam   BP (!) 145/62   Pulse 81   Temp 98.4  F (36.9  C) (Oral)   Resp 16   Ht 1.727 m (5' 8\")   Wt 80.7 kg (178 lb)   SpO2 98%   BMI 27.06 kg/m       Weight: 178 lbs 0 oz Body mass index is 27.06 kg/m .     Constitutional: Alert, oriented, pleasant and cooperative, no apparent distress, appears nontoxic  Eyes: Eyes are clear, pupils are reactive.  HENT: Oropharynx is clear and moist. No evidence of cranial trauma.  Lymph/Hematologic: No epitrochlear, axillary, anterior or posterior cervical, or supraclavicular lymphadenopathy is appreciated.  Cardiovascular: Regular rate and rhythm, normal S1 and S2, and no murmur noted. 2+ pulses BLE & BUE. No lower extremity edema.  Respiratory: Clear to auscultation bilaterally.   GI: Soft, non-tender, normal bowel sounds, no " hepatomegaly.  Genitourinary: Deferred  Musculoskeletal: Normal muscle bulk and tone.  Skin: Warm and dry, no rashes.   Neurologic: Neck supple. Cranial nerves are grossly intact.  is symmetric.     Data   Data reviewed today:   Recent Labs   Lab 02/21/23  1848 02/20/23  1851   WBC 6.4 10.2   HGB 12.8* 12.5*   MCV 91 92   * 96*    135*   POTASSIUM 3.6 3.9   CHLORIDE 100 99   CO2 26 22   BUN 17.7 13.6   CR 1.20* 1.08   ANIONGAP 10 14   AISHWARYA 8.9 8.8   * 180*       Recent Results (from the past 24 hour(s))   Abd/pelvis CT - no contrast - Stone Protocol    Narrative    EXAM: CT ABDOMEN PELVIS W/O CONTRAST  LOCATION: [facility]  DATE/TIME: 2/21/2023 7:28 PM    INDICATION: For history of UTI with possible sepsis.  COMPARISON: None.  TECHNIQUE: CT scan of the abdomen and pelvis was performed without IV contrast. Multiplanar reformats were obtained. Dose reduction techniques were used.  CONTRAST: None.    FINDINGS:   LOWER CHEST: There are some benign calcified granuloma seen in the right lung base. Mild to moderate coronary artery calcification.    HEPATOBILIARY: There are few tiny scattered benign calcified granulomas in the liver. Slightly contracted gallbladder likely secondary to nonfasting status of the patient given the appearance of the stomach. The liver, gallbladder common bile ducts are   otherwise normal.    PANCREAS: Normal.    SPLEEN: Numerous scattered benign calcified granulomas.    ADRENAL GLANDS: Normal.    KIDNEYS/BLADDER: Normal.    BOWEL: There are moderate findings of diverticulosis with no evidence for diverticulitis. The appendix is normal.    LYMPH NODES: Normal.    VASCULATURE: Moderate calcification with no aneurysmal dilatation.    PELVIC ORGANS: Mild prostatic gland enlargement.    MUSCULOSKELETAL: There is unilateral spondylolysis seen at L5 on the right with no spondylolisthesis. Advanced multilevel degenerative changes of the lumbar spine  most marked at the L2-L3 interspace. Scattered benign Schmorl's nodes.      Impression    IMPRESSION:   1.  Sequelae of prior granulomatous infection with numerous benign calcified granulomas.    2.  Unilateral spondylolysis seen at L5 on the right with no spondylolisthesis.    3. No acute abnormalities identified.         I personally reviewed the abdominal CT image(s) showing No acute abnormalities identified, unilateral spondylolysis at L5 on the right, sequela of prior granulomatous with multiple benign calcified granulomas.

## 2023-02-22 NOTE — ED PROVIDER NOTES
"     Emergency Department Patient Sign-out       Brief HPI:  This is a 73 year old male signed out to me by Dr. Hayes .  See initial ED Provider note for details of the presentation.      See initial ED Provider note for full details of the presentation. Interval history is pertinent for   positive blood culture gram negative rods, urinary obstruction, ID recommends observing until blood culture and urine culture sensitivities            Significant Events prior to my assuming care:       Exam:   Patient Vitals for the past 24 hrs:   BP Temp Temp src Pulse Resp SpO2 Height Weight   02/21/23 1550 (!) 160/94 98.7  F (37.1  C) Tympanic 87 16 98 % 1.727 m (5' 8\") 80.7 kg (178 lb)           ED RESULTS:   Results for orders placed or performed during the hospital encounter of 02/21/23 (from the past 24 hour(s))   CBC with platelets differential     Status: Abnormal    Collection Time: 02/21/23  6:48 PM    Narrative    The following orders were created for panel order CBC with platelets differential.  Procedure                               Abnormality         Status                     ---------                               -----------         ------                     CBC with platelets and d...[[id number]]  Abnormal            Final result                 Please view results for these tests on the individual orders.   Basic metabolic panel     Status: Abnormal    Collection Time: 02/21/23  6:48 PM   Result Value Ref Range    Sodium 136 136 - 145 mmol/L    Potassium 3.6 3.4 - 5.3 mmol/L    Chloride 100 98 - 107 mmol/L    Carbon Dioxide (CO2) 26 22 - 29 mmol/L    Anion Gap 10 7 - 15 mmol/L    Urea Nitrogen 17.7 8.0 - 23.0 mg/dL    Creatinine 1.20 (H) 0.67 - 1.17 mg/dL    Calcium 8.9 8.8 - 10.2 mg/dL    Glucose 156 (H) 70 - 99 mg/dL    GFR Estimate 63 >60 mL/min/1.73m2   Lactic acid whole blood     Status: Normal    Collection Time: 02/21/23  6:48 PM   Result Value Ref Range    Lactic Acid 0.7 0.7 - 2.0 mmol/L   [place] " Draw     Status: None    Collection Time: 02/21/23  6:48 PM    Narrative    The following orders were created for panel order [place] Draw.  Procedure                               Abnormality         Status                     ---------                               -----------         ------                     Extra Blue Top Tube[[id number]]                              Final result                 Please view results for these tests on the individual orders.   CBC with platelets and differential     Status: Abnormal    Collection Time: 02/21/23  6:48 PM   Result Value Ref Range    WBC Count 6.4 4.0 - 11.0 10e3/uL    RBC Count 4.06 (L) 4.40 - 5.90 10e6/uL    Hemoglobin 12.8 (L) 13.3 - 17.7 g/dL    Hematocrit 37.1 (L) 40.0 - 53.0 %    MCV 91 78 - 100 fL    MCH 31.5 26.5 - 33.0 pg    MCHC 34.5 31.5 - 36.5 g/dL    RDW 12.3 10.0 - 15.0 %    Platelet Count 106 (L) 150 - 450 10e3/uL    % Neutrophils 82 %    % Lymphocytes 7 %    % Monocytes 9 %    % Eosinophils 1 %    % Basophils 0 %    % Immature Granulocytes 1 %    NRBCs per 100 WBC 0 <1 /100    Absolute Neutrophils 5.4 1.6 - 8.3 10e3/uL    Absolute Lymphocytes 0.4 (L) 0.8 - 5.3 10e3/uL    Absolute Monocytes 0.6 0.0 - 1.3 10e3/uL    Absolute Eosinophils 0.1 0.0 - 0.7 10e3/uL    Absolute Basophils 0.0 0.0 - 0.2 10e3/uL    Absolute Immature Granulocytes 0.0 <=0.4 10e3/uL    Absolute NRBCs 0.0 10e3/uL   Extra Blue Top Tube     Status: None    Collection Time: 02/21/23  6:48 PM   Result Value Ref Range    Hold Specimen jic    UA reflex to Microscopic and Culture     Status: Abnormal    Collection Time: 02/21/23  6:49 PM    Specimen: Urine, Clean Catch   Result Value Ref Range    Color Urine Dark Yellow (A) Colorless, Straw, Light Yellow, Yellow    Appearance Urine Clear Clear    Glucose Urine Negative Negative mg/dL    Bilirubin Urine Negative Negative    Ketones Urine Negative Negative mg/dL    Specific Gravity Urine 1.020 1.003 - 1.035    Blood Urine Negative Negative     pH Urine 5.5 5.0 - 7.0    Protein Albumin Urine 100 (A) Negative mg/dL    Urobilinogen Urine Normal Normal, 2.0 mg/dL    Nitrite Urine Positive (A) Negative    Leukocyte Esterase Urine Negative Negative    Mucus Urine Present (A) None Seen /LPF    RBC Urine 5 (H) <=2 /HPF    WBC Urine 8 (H) <=5 /HPF    Squamous Epithelials Urine <1 <=1 /HPF    Narrative    Urine Culture ordered based on laboratory criteria   Abd/pelvis CT - no contrast - Stone Protocol     Status: None    Collection Time: 02/21/23  7:28 PM    Narrative    EXAM: CT ABDOMEN PELVIS W/O CONTRAST  LOCATION: [facility]  DATE/TIME: 2/21/2023 7:28 PM    INDICATION: For history of UTI with possible sepsis.  COMPARISON: None.  TECHNIQUE: CT scan of the abdomen and pelvis was performed without IV contrast. Multiplanar reformats were obtained. Dose reduction techniques were used.  CONTRAST: None.    FINDINGS:   LOWER CHEST: There are some benign calcified granuloma seen in the right lung base. Mild to moderate coronary artery calcification.    HEPATOBILIARY: There are few tiny scattered benign calcified granulomas in the liver. Slightly contracted gallbladder likely secondary to nonfasting status of the patient given the appearance of the stomach. The liver, gallbladder common bile ducts are   otherwise normal.    PANCREAS: Normal.    SPLEEN: Numerous scattered benign calcified granulomas.    ADRENAL GLANDS: Normal.    KIDNEYS/BLADDER: Normal.    BOWEL: There are moderate findings of diverticulosis with no evidence for diverticulitis. The appendix is normal.    LYMPH NODES: Normal.    VASCULATURE: Moderate calcification with no aneurysmal dilatation.    PELVIC ORGANS: Mild prostatic gland enlargement.    MUSCULOSKELETAL: There is unilateral spondylolysis seen at L5 on the right with no spondylolisthesis. Advanced multilevel degenerative changes of the lumbar spine most marked at the L2-L3 interspace. Scattered benign Schmorl's  nodes.      Impression    IMPRESSION:   1.  Sequelae of prior granulomatous infection with numerous benign calcified granulomas.    2.  Unilateral spondylolysis seen at L5 on the right with no spondylolisthesis.    3. No acute abnormalities identified.         ED MEDICATIONS:   Medications   amLODIPine (NORVASC) tablet 10 mg (10 mg Oral Given 2/21/23 2024)   dronedarone (MULTAQ) tablet 400 mg (400 mg Oral Not Given 2/21/23 2041)   sodium chloride 0.9% infusion (has no administration in time range)   acetaminophen (TYLENOL) tablet 650 mg (has no administration in time range)   ondansetron (ZOFRAN ODT) ODT tab 4 mg (has no administration in time range)     Or   ondansetron (ZOFRAN) injection 4 mg (has no administration in time range)   apixaban ANTICOAGULANT (ELIQUIS) tablet 5 mg (5 mg Oral Given 2/21/23 2024)   sulfamethoxazole-trimethoprim (BACTRIM DS) 800-160 MG per tablet 1 tablet (1 tablet Oral Given 2/21/23 2024)   cefTRIAXone (ROCEPHIN) 1 g vial to attach to  mL bag for ADULTS or NS 50 mL bag for PEDS (0 g Intravenous Stopped 2/21/23 2114)   cefTRIAXone (ROCEPHIN) 1 g vial to attach to  mL bag for ADULTS or NS 50 mL bag for PEDS (1 g Intravenous New Bag 2/21/23 2236)         Impression:    ICD-10-CM    1. Urinary tract infection without hematuria, site unspecified  N39.0       2. Gram-positive bacteremia  R78.81           Plan:    Pending studies include culture sensitivity.        MD Hugo Cates Scott J, MD  02/21/23 6539

## 2023-02-22 NOTE — ED PROVIDER NOTES
Emergency Department Patient Sign-out     ED Visit Duration at the Time of Chart Review:  1613    ED Summary and Plan at Sign Out:  73-year-old male presenting with blood culture positive for gram-negative rods.  Patient found to have a urinary tract infection on 2/21/2023.  He was treated with ceftriaxone in the ED and then started on Bactrim.  Clinically improved at the time of presentation for this visit.  Blood pressure 160/94, pulse 87, temperature 98.7 on arrival.  No distress.  No abdominal tenderness.  No evidence of dehydration.  Sodium 135, glucose 180, hemoglobin 12.5.  E. coli identified on blood culture.  Infectious disease consulted and they recommended continued use of ceftriaxone in the ED until further cultures returned.  Patient was in agreement with that plan.    ED MDM:  0711.  Patient unchanged clinically.  Pulse 81, blood pressure 120/53, temperature 98.7.  Sensitivities of the 1 blood culture result with E. coli are not returned.  Urine culture not yet back.  Continue ceftriaxone and Bactrim.    0842.  Reviewed the patient's case with him.  He is agreeable to stay until tomorrow morning at which point the sensitivities from the positive blood culture should be back (I did call the lab at the [facility] and talked with them specifically).  Patient is eating food.  We are clarifying his home medicines and making sure that he is receiving the correct ones.  We are trying to track down how he will get the dronedarone as we do not have it here at [facility].    IMPRESSION:    ICD-10-CM    1. Urinary tract infection without hematuria, site unspecified  N39.0       2. Gram-positive bacteremia  R78.81              Francisco Huber MD  02/22/23 5086

## 2023-02-23 VITALS
SYSTOLIC BLOOD PRESSURE: 128 MMHG | DIASTOLIC BLOOD PRESSURE: 62 MMHG | RESPIRATION RATE: 18 BRPM | WEIGHT: 176.15 LBS | HEIGHT: 68 IN | OXYGEN SATURATION: 95 % | HEART RATE: 75 BPM | BODY MASS INDEX: 26.7 KG/M2 | TEMPERATURE: 98.2 F

## 2023-02-23 LAB
ALBUMIN SERPL BCG-MCNC: 3 G/DL (ref 3.5–5.2)
ALP SERPL-CCNC: 64 U/L (ref 40–129)
ALT SERPL W P-5'-P-CCNC: 18 U/L (ref 10–50)
ANION GAP SERPL CALCULATED.3IONS-SCNC: 8 MMOL/L (ref 7–15)
AST SERPL W P-5'-P-CCNC: 21 U/L (ref 10–50)
BACTERIA BLD CULT: ABNORMAL
BACTERIA BLD CULT: ABNORMAL
BACTERIA UR CULT: NO GROWTH
BILIRUB SERPL-MCNC: 0.2 MG/DL
BUN SERPL-MCNC: 12.3 MG/DL (ref 8–23)
CALCIUM SERPL-MCNC: 8.2 MG/DL (ref 8.8–10.2)
CHLORIDE SERPL-SCNC: 104 MMOL/L (ref 98–107)
CREAT SERPL-MCNC: 1.18 MG/DL (ref 0.67–1.17)
DEPRECATED HCO3 PLAS-SCNC: 25 MMOL/L (ref 22–29)
ERYTHROCYTE [DISTWIDTH] IN BLOOD BY AUTOMATED COUNT: 12.6 % (ref 10–15)
GFR SERPL CREATININE-BSD FRML MDRD: 64 ML/MIN/1.73M2
GLUCOSE SERPL-MCNC: 112 MG/DL (ref 70–99)
HCT VFR BLD AUTO: 31.7 % (ref 40–53)
HGB BLD-MCNC: 10.7 G/DL (ref 13.3–17.7)
MCH RBC QN AUTO: 30.9 PG (ref 26.5–33)
MCHC RBC AUTO-ENTMCNC: 33.8 G/DL (ref 31.5–36.5)
MCV RBC AUTO: 92 FL (ref 78–100)
PLATELET # BLD AUTO: 98 10E3/UL (ref 150–450)
POTASSIUM SERPL-SCNC: 3.6 MMOL/L (ref 3.4–5.3)
PROT SERPL-MCNC: 5.9 G/DL (ref 6.4–8.3)
RBC # BLD AUTO: 3.46 10E6/UL (ref 4.4–5.9)
SODIUM SERPL-SCNC: 137 MMOL/L (ref 136–145)
WBC # BLD AUTO: 2.8 10E3/UL (ref 4–11)

## 2023-02-23 PROCEDURE — 250N000013 HC RX MED GY IP 250 OP 250 PS 637: Performed by: FAMILY MEDICINE

## 2023-02-23 PROCEDURE — 36415 COLL VENOUS BLD VENIPUNCTURE: CPT | Performed by: NURSE PRACTITIONER

## 2023-02-23 PROCEDURE — 96366 THER/PROPH/DIAG IV INF ADDON: CPT

## 2023-02-23 PROCEDURE — 99238 HOSP IP/OBS DSCHRG MGMT 30/<: CPT | Performed by: NURSE PRACTITIONER

## 2023-02-23 PROCEDURE — G0378 HOSPITAL OBSERVATION PER HR: HCPCS

## 2023-02-23 PROCEDURE — 80053 COMPREHEN METABOLIC PANEL: CPT | Performed by: NURSE PRACTITIONER

## 2023-02-23 PROCEDURE — 250N000013 HC RX MED GY IP 250 OP 250 PS 637: Performed by: NURSE PRACTITIONER

## 2023-02-23 PROCEDURE — 85027 COMPLETE CBC AUTOMATED: CPT | Performed by: NURSE PRACTITIONER

## 2023-02-23 RX ORDER — SULFAMETHOXAZOLE/TRIMETHOPRIM 800-160 MG
1 TABLET ORAL 2 TIMES DAILY
Status: DISCONTINUED | OUTPATIENT
Start: 2023-02-23 | End: 2023-02-23 | Stop reason: HOSPADM

## 2023-02-23 RX ADMIN — PANTOPRAZOLE SODIUM 40 MG: 40 TABLET, DELAYED RELEASE ORAL at 07:41

## 2023-02-23 RX ADMIN — APIXABAN 5 MG: 5 TABLET, FILM COATED ORAL at 07:41

## 2023-02-23 RX ADMIN — FUROSEMIDE 10 MG: 20 TABLET ORAL at 07:41

## 2023-02-23 RX ADMIN — SULFAMETHOXAZOLE AND TRIMETHOPRIM 1 TABLET: 800; 160 TABLET ORAL at 07:41

## 2023-02-23 ASSESSMENT — ACTIVITIES OF DAILY LIVING (ADL)
ADLS_ACUITY_SCORE: 20

## 2023-02-23 NOTE — PROGRESS NOTES
IV fluids infusing at 150 mL/hr. IV rocephin given per MAR. Up independent in room. Afebrile. Vital signs stable. Alert and oriented.

## 2023-02-23 NOTE — PROGRESS NOTES
"[facility] [place] ADMISSION NOTE    Patient admitted to room 2307 at approximately 1600 via wheel chair from emergency room. Patient was accompanied by transport tech.     Verbal SBAR report received from SYDNI Flood prior to patient arrival.     Patient ambulated to bed independently. Patient alert and oriented X 3. The patient is not having any pain.  . Admission vital signs: Blood pressure 126/70, pulse 113, temperature 99.1  F (37.3  C), temperature source Oral, resp. rate 18, height 1.727 m (5' 8\"), weight 80.7 kg (178 lb), SpO2 97 %. Patient was oriented to plan of care, call light, bed controls, tv, telephone, bathroom and visiting hours.     Risk Assessment    The following safety risks were identified during admission: none. Yellow risk band applied: NO.     Skin Initial Assessment    This writer admitted this patient and completed a full skin assessment and Curt score in the Adult PCS flowsheet. Appropriate interventions initiated as needed.     Patient declined skin assessment.    Curt Risk Assessment  Sensory Perception: 4-->no impairment  Moisture: 4-->rarely moist  Activity: 4-->walks frequently  Mobility: 4-->no limitation  Nutrition: 3-->adequate  Friction and Shear: 3-->no apparent problem  Curt Score: 22  Mattress: Standard gel/foam mattress (IsoFlex, Atmos Air, etc.)  Bed Frame: Standard width and length    Education    Patient has a [place] to Observation order: Yes  Observation education completed and documented: Yes      Daisy Ureña RN    "

## 2023-02-23 NOTE — PLAN OF CARE
Patient is alert and oriented, pleasant and cooperative. Independent in room. Sensitivities resulted at around 10 pm last night. IV saline locked. Denies pain.

## 2023-02-23 NOTE — PROGRESS NOTES
[facility] DISCHARGE NOTE    Patient discharged to home at 11:40 AM via ambulation. Accompanied by staff. Discharge instructions reviewed with patient, opportunity offered to ask questions. Prescriptions - None ordered for discharge. All belongings sent with patient.    Eda Collins RN

## 2023-02-23 NOTE — DISCHARGE SUMMARY
[facility]    Discharge Summary  Hospital Medicine    Date of Admission:  2/21/2023  Date of Discharge:  2/23/2023   Discharging Provider: OTILIA RIVERA CNP  Date of Service: 2/23/2023      Primary Care     Clinic, [facility]      Identification and Chief Compaint: Giovanni Jones is a 73 year old male who called back on 2/21/2023 due to positive blood cultures.    Discharge Diagnoses    Gram-negative bacteremia (E. coli)      * No active hospital problems. *    * No resolved hospital problems. *      Discharge Disposition   Discharged to home    Discharge Orders      Reason for your hospital stay    Concern for sepsis, Gram negative bacteremia.     Activity    Your activity upon discharge: activity as tolerated     Follow-up and recommended labs and tests     Follow up with primary care provider, [facility], within 10 days to evaluate treatment change.  The following labs/tests are recommended: Blood cultures.     Diet    Follow this diet upon discharge: Orders Placed This Encounter      Combination Diet Low Saturated Fat Na <2400mg Diet, Low Saturated Fat Diet        Discharge Medications   Current Discharge Medication List      CONTINUE these medications which have NOT CHANGED    Details   amLODIPine (NORVASC) 10 MG tablet Take 1 tablet by mouth daily      apixaban ANTICOAGULANT (ELIQUIS) 5 MG tablet Take 1 tablet by mouth 2 times daily      colchicine (COLCYRS) 0.6 MG tablet TAKE 1/2 TABLET (0.3MG) BY MOUTH EVERY DAY FOR PERICARDITIS      dronedarone (MULTAQ) 400 MG TABS tablet Take 1 tablet by mouth 2 times daily      furosemide (LASIX) 20 MG tablet Take 10 mg by mouth daily      METAMUCIL FIBER PO Take by mouth as needed      Multiple Vitamins-Minerals (PRESERVISION AREDS 2) CAPS TAKE 1 CAP/TAB BY MOUTH TWICE A DAY      nicotine polacrilex (NICORETTE) 4 MG gum CHEW 1 PIECE IN MOUTH AS DIRECTED AND PARK BETWEEN CHEEK AND GUM - FOR URGE TO  SMOKE      Omega-3 Fatty Acids (FISH OIL BURP-LESS) 1200 MG CAPS Take 1,200 tablets by mouth 2 times daily      omeprazole (PRILOSEC) 20 MG DR capsule Take 20 mg by mouth daily 30 minutes prior to a meal      rosuvastatin (CRESTOR) 20 MG tablet Take 20 mg by mouth At Bedtime      sulfamethoxazole-trimethoprim (BACTRIM DS) 800-160 MG tablet Take 1 tablet by mouth 2 times daily for 7 days  Qty: 14 tablet, Refills: 0      phenazopyridine (PYRIDIUM) 200 MG tablet Take 1 tablet (200 mg) by mouth 3 times daily as needed for irritation (as needed for urinary discomfort)  Qty: 12 tablet, Refills: 0           Allergies   No Known Allergies    Consultations This Hospital Stay    None    Significant Results and Procedures   Procedures    None    Data   Results for orders placed or performed during the hospital encounter of 02/21/23   Abd/pelvis CT - no contrast - Stone Protocol    Narrative    EXAM: CT ABDOMEN PELVIS W/O CONTRAST  LOCATION: [facility]  DATE/TIME: 2/21/2023 7:28 PM    INDICATION: For history of UTI with possible sepsis.  COMPARISON: None.  TECHNIQUE: CT scan of the abdomen and pelvis was performed without IV contrast. Multiplanar reformats were obtained. Dose reduction techniques were used.  CONTRAST: None.    FINDINGS:   LOWER CHEST: There are some benign calcified granuloma seen in the right lung base. Mild to moderate coronary artery calcification.    HEPATOBILIARY: There are few tiny scattered benign calcified granulomas in the liver. Slightly contracted gallbladder likely secondary to nonfasting status of the patient given the appearance of the stomach. The liver, gallbladder common bile ducts are   otherwise normal.    PANCREAS: Normal.    SPLEEN: Numerous scattered benign calcified granulomas.    ADRENAL GLANDS: Normal.    KIDNEYS/BLADDER: Normal.    BOWEL: There are moderate findings of diverticulosis with no evidence for diverticulitis. The appendix is normal.    LYMPH  NODES: Normal.    VASCULATURE: Moderate calcification with no aneurysmal dilatation.    PELVIC ORGANS: Mild prostatic gland enlargement.    MUSCULOSKELETAL: There is unilateral spondylolysis seen at L5 on the right with no spondylolisthesis. Advanced multilevel degenerative changes of the lumbar spine most marked at the L2-L3 interspace. Scattered benign Schmorl's nodes.      Impression    IMPRESSION:   1.  Sequelae of prior granulomatous infection with numerous benign calcified granulomas.    2.  Unilateral spondylolysis seen at L5 on the right with no spondylolisthesis.    3. No acute abnormalities identified.         History of Present Illness   Giovanni Jones is a 73 year old male who presents on 2/21/2023 after being called back due to positive blood culture.    Mr. Jones was previously seen in the ED on 2/20/2023 for UTI symptoms of dysuria, frequency, and discomfort.  He was discharged on Bactrim.  On 2/21/2023 he was contacted due to GNR growth in 1/2 blood culture.  He returned to the ED and was started on ceftriaxone along with the Bactrim per infectious disease.  This will continue until susceptibility of culture is completed.  He reports improvement of the symptoms since initial treatment with Bactrim.  Denies any further fevers or urinary symptoms.    Hospital Course      Urinary tract infection  Positive blood culture GNR growth  Gram negative bacteremia without leukocytosis  Urinary tract infection after Jean-Baptiste catheter placement during procedure.  Symptom onset 2 to 3 days after procedure.  1 of 2 blood cultures returned gram-negative rods, patient was advised to return to the hospital which he did.  No leukocytosis.  Reported fever of 100.7 on initial visit on 2/20/2023  -Continue treatment with Bactrim  -Add ceftriaxone per ED- ID conversation  -Admit for observation while awaiting susceptibilities  -Adjust antibiotic therapy based on susceptibilities  1/23/23  Susceptibility returned with multiple  antibiotic options including Bactrim.  Discharge home with 10 total days antibiotic treatment with Bactrim.  Follow-up with primary care for repeat blood cultures on completion of antibiotic treatment.     Hypertension  Hyperlipidemia  Pericarditis  Atrial fibrillation, paroxysmal  Continue home medications colchicine, furosemide, amlodipine, and apixaban     GERD  Substitute pantoprazole for omeprazole    Pending Results   Unresulted Labs Ordered in the Past 30 Days of this Admission     Date and Time Order Name Status Description    2/21/2023  9:53 PM Blood Culture Peripheral Blood Preliminary     2/21/2023  9:53 PM Blood Culture Line, venous Preliminary     2/20/2023  6:39 PM Blood Culture Peripheral Blood Preliminary           Physical Exam   Temp:  [98.2  F (36.8  C)-99.3  F (37.4  C)] 98.2  F (36.8  C)  Pulse:  [] 75  Resp:  [16-18] 18  BP: (125-146)/(62-70) 128/62  SpO2:  [95 %-98 %] 95 %  Vitals:    02/21/23 1550 02/23/23 0309   Weight: 80.7 kg (178 lb) 79.9 kg (176 lb 2.4 oz)       Constitutional: Alert and in no distress sitting in bed.  CV: RRR, no BLE edema, 2+ pulses BUE BLE  Respiratory: CTA bilaterally  GI: Soft, nontender  Skin: Warm and dry  Musculoskeletal: No deficiencies noted  Neurological: Oriented x3, no nerves grossly intact    The discharge plan was discussed with the patient and Dr. Pham    Total time on this discharge was 25 minutes.    OTILIA RIVERA CNP

## 2023-02-24 ENCOUNTER — PATIENT OUTREACH (OUTPATIENT)
Dept: CARE COORDINATION | Facility: CLINIC | Age: 73
End: 2023-02-24

## 2023-02-24 NOTE — PROGRESS NOTES
[facility]: Post-Discharge Note  SITUATION                                                      Admission:    Admission Date: 02/21/23   Reason for Admission: after being called back due to positive blood culture  Discharge:   Discharge Date: 02/23/23  Discharge Diagnosis: Gram-negative bacteremia (E. coli)    BACKGROUND                                                      Per hospital discharge summary and inpatient provider notes:  Urinary tract infection  Positive blood culture GNR growth  Gram negative bacteremia without leukocytosis  Urinary tract infection after Jean-Baptiste catheter placement during procedure.  Symptom onset 2 to 3 days after procedure.  1 of 2 blood cultures returned gram-negative rods, patient was advised to return to the hospital which he did.  No leukocytosis.  Reported fever of 100.7 on initial visit on 2/20/2023  -Continue treatment with Bactrim  -Add ceftriaxone per ED- ID conversation  -Admit for observation while awaiting susceptibilities  -Adjust antibiotic therapy based on susceptibilities  1/23/23  Susceptibility returned with multiple antibiotic options including Bactrim.  Discharge home with 10 total days antibiotic treatment with Bactrim.  Follow-up with primary care for repeat blood cultures on completion of antibiotic treatment.    ASSESSMENT        Discharge Assessment  How are you doing now that you are home?: Great  How are your symptoms? (Red Flag symptoms escalate to triage hotline per guidelines): Improved  Do you feel your condition is stable enough to be safe at home until your provider visit?: Yes  Does the patient have their discharge instructions? : Yes  Does the patient have questions regarding their discharge instructions? : No  Were you started on any new medications or were there changes to any of your previous medications? : No  Does the patient have all of their medications?: Yes  Do you have questions regarding any of your medications? : No  Do you have all  of your needed medical supplies or equipment (DME)?  (i.e. oxygen tank, CPAP, cane, etc.): Yes  Discharge follow-up appointment scheduled within 14 calendar days? : No ([facility]  Is patient agreeable to assistance with scheduling? : No    Post-op ([facility] CTA Only)  If the patient had a surgery or procedure, do they have any questions for a nurse?: No      PLAN                                                      Outpatient Plan: Follow up with primary care provider, [facility], within 10 days to evaluate treatment change.  The following labs/tests are recommended: Blood cultures.    No future appointments.      For any urgent concerns, please contact our 24 hour nurse triage line: [phone] ([phone])         PAUL Cook  [phone]  [facility]

## 2023-02-25 LAB — BACTERIA BLD CULT: NO GROWTH

## 2023-02-27 LAB
BACTERIA BLD CULT: NO GROWTH
BACTERIA BLD CULT: NO GROWTH